# Patient Record
Sex: FEMALE | Race: BLACK OR AFRICAN AMERICAN | Employment: FULL TIME | URBAN - METROPOLITAN AREA
[De-identification: names, ages, dates, MRNs, and addresses within clinical notes are randomized per-mention and may not be internally consistent; named-entity substitution may affect disease eponyms.]

---

## 2018-09-24 ENCOUNTER — OFFICE VISIT (OUTPATIENT)
Dept: SURGERY | Age: 48
End: 2018-09-24

## 2018-09-24 VITALS
HEIGHT: 63 IN | HEART RATE: 78 BPM | BODY MASS INDEX: 19.84 KG/M2 | WEIGHT: 112 LBS | SYSTOLIC BLOOD PRESSURE: 132 MMHG | DIASTOLIC BLOOD PRESSURE: 71 MMHG

## 2018-09-24 DIAGNOSIS — N64.4 MASTODYNIA OF LEFT BREAST: Primary | ICD-10-CM

## 2018-09-24 DIAGNOSIS — N60.12 FIBROCYSTIC BREAST CHANGES, LEFT: ICD-10-CM

## 2018-09-24 RX ORDER — IBUPROFEN 200 MG
TABLET ORAL
COMMUNITY

## 2018-09-24 RX ORDER — FLUOXETINE 10 MG/1
CAPSULE ORAL
Refills: 1 | COMMUNITY
Start: 2018-09-04

## 2018-09-24 NOTE — PROGRESS NOTES
HISTORY OF PRESENT ILLNESS  Chris Brizuela is a 56 y.o. female. HPI ESTABLISHED patient here for recurrent LEFT breast painful lump. It has been particularly bothersome since she had her mammogram in March. Takes Advil for pain. LEFT breast cyst aspirated 2015 at LEFT breast 1-2 OC, 5 cc cyst.   Lifetime risk 28%     Family history-  Sister diagnosed with breast cancer at age 52, survivor- no genetic testing. Paternal 1st cousin diagnosed with breast cancer at age 39, . Patient is genetic test negative [facility]).     Breast imaging-  Recent mammogram and US done 2018 at [facility]. Report in CC. Review of Systems   All other systems reviewed and are negative. Physical Exam   Pulmonary/Chest: Right breast exhibits no inverted nipple, no mass, no nipple discharge, no skin change and no tenderness. Left breast exhibits no inverted nipple, no mass, no nipple discharge, no skin change and no tenderness. Breasts are symmetrical.   No enlarged cyst today on us, just multiple small cysts on left breast.   Lymphadenopathy:     She has no cervical adenopathy. She has no axillary adenopathy. Nursing note and vitals reviewed. ASSESSMENT and PLAN    ICD-10-CM ICD-9-CM    1. Mastodynia of left breast N64.4 611.71    2. Fibrocystic breast changes, left N60.12 610.1      Discussed breast pain which is not typically a sign of breast cancer. I have given the patient a breast pain sheet. She may try vitamin E, Evening Primrose Oil, or decreased caffeine intake if breast pain persists.

## 2018-09-24 NOTE — MR AVS SNAPSHOT
[address] [facility] G1 [address] [place] [address] 
[phone] Patient: Peggy Muniz MRN: [id number] :1970 Visit Information Date & Time Provider Department Dept. Phone Encounter #  
 2018  2:40  Ede Mahmood MD [address] at [place] [address] [phone] Upcoming Health Maintenance Date Due DTaP/Tdap/Td series (1 - Tdap) 1992 PAP AKA CERVICAL CYTOLOGY 2/3/2018 Influenza Age 5 to Adult 2018 COLONOSCOPY 3/15/2020 Allergies as of 2018  Review Complete On: 2018 By: Kay Wisdom RN No Known Allergies Current Immunizations  Never Reviewed No immunizations on file. Not reviewed this visit Vitals BP Pulse Height(growth percentile) Weight(growth percentile) LMP BMI  
 132/71 78 5' 3\" (1.6 m) 112 lb (50.8 kg) 2018 19.84 kg/m2 OB Status Smoking Status Having regular periods Never Smoker Vitals History BMI and BSA Data Body Mass Index Body Surface Area  
 19.84 kg/m 2 1.5 m 2 Your Updated Medication List  
  
   
This list is accurate as of 18  3:10 PM.  Always use your most recent med list. ADVIL 200 mg tablet Generic drug:  ibuprofen Take  by mouth. FLUoxetine 10 mg capsule Commonly known as:  PROzac VALTREX 500 mg tablet Generic drug:  valACYclovir Take  by mouth as needed. Patient Instructions Breast Lumps: Care Instructions Your Care Instructions Breast lumps are common, especially in women between ages 27 and 48. Many women's breasts feel lumpy and tender before their menstrual period. Women also may have lumps when they are breastfeeding. Breast lumps may go away after menopause. All new breast lumps in women after menopause should be checked by a doctor. Although lumps may be normal for you, it is important to have your doctor check any lump or thickness that is not like the rest of your breast to make sure it is not cancer. A lump may be larger, harder, or different from the rest of your breast tissue. Follow-up care is a key part of your treatment and safety. Be sure to make and go to all appointments, and call your doctor if you are having problems. It's also a good idea to know your test results and keep a list of the medicines you take. How can you care for yourself at home? · Make an appointment to have a mammogram and other follow-up visits as recommended by your doctor. When should you call for help? Watch closely for changes in your health, and be sure to contact your doctor if: 
  · You do not get better as expected.  
  · Your breast has changed.  
  · You have pain in your breast.  
  · You have a discharge from your nipple.  
  · A breast lump changes or does not go away. Where can you learn more? Go to http://sophia-alex.info/. Enter N156 in the search box to learn more about \"Breast Lumps: Care Instructions. \" Current as of: October 6, 2017 Content Version: 11.7 © 9874-0378 BookNow, Incorporated. Care instructions adapted under license by [employer] (which disclaims liability or warranty for this information). If you have questions about a medical condition or this instruction, always ask your healthcare professional. [place] [address] any warranty or liability for your use of this information. Introducing [facility] & HEALTH SERVICES! [facility] introduces [employer] patient portal. Now you can access parts of your medical record, email your doctor's office, and request medication refills online. 1. In your internet browser, go to [email or web address] 2. Click on the First Time User? Click Here link in the Sign In box. You will see the New Member Sign Up page. 3. Enter your VenueSpot Access Code exactly as it appears below. You will not need to use this code after youve completed the sign-up process. If you do not sign up before the expiration date, you must request a new code. · VenueSpot Access Code: [email or web address] Expires: 12/23/2018  2:45 PM 
 
4. Enter the last four digits of your Social Security Number (xxxx) and Date of Birth (mm/dd/yyyy) as indicated and click Submit. You will be taken to the next sign-up page. 5. Create a Mosaict ID. This will be your VenueSpot login ID and cannot be changed, so think of one that is secure and easy to remember. 6. Create a VenueSpot password. You can change your password at any time. 7. Enter your Password Reset Question and Answer. This can be used at a later time if you forget your password. 8. Enter your e-mail address. You will receive e-mail notification when new information is available in [address]. 9. Click Sign Up. You can now view and download portions of your medical record. 10. Click the Download Summary menu link to download a portable copy of your medical information. If you have questions, please visit the Frequently Asked Questions section of the VenueSpot website. Remember, VenueSpot is NOT to be used for urgent needs. For medical emergencies, dial 911. Now available from your iPhone and Android! Please provide this summary of care documentation to your next provider. Your primary care clinician is listed as Tessa Tena. If you have any questions after today's visit, please call [phone].

## 2018-09-24 NOTE — PATIENT INSTRUCTIONS
Breast Lumps: Care Instructions  Your Care Instructions  Breast lumps are common, especially in women between ages 27 and 48. Many women's breasts feel lumpy and tender before their menstrual period. Women also may have lumps when they are breastfeeding. Breast lumps may go away after menopause. All new breast lumps in women after menopause should be checked by a doctor. Although lumps may be normal for you, it is important to have your doctor check any lump or thickness that is not like the rest of your breast to make sure it is not cancer. A lump may be larger, harder, or different from the rest of your breast tissue. Follow-up care is a key part of your treatment and safety. Be sure to make and go to all appointments, and call your doctor if you are having problems. It's also a good idea to know your test results and keep a list of the medicines you take. How can you care for yourself at home? · Make an appointment to have a mammogram and other follow-up visits as recommended by your doctor. When should you call for help? Watch closely for changes in your health, and be sure to contact your doctor if:    · You do not get better as expected.     · Your breast has changed.     · You have pain in your breast.     · You have a discharge from your nipple.     · A breast lump changes or does not go away. Where can you learn more? Go to http://sophia-alex.info/. Enter X220 in the search box to learn more about \"Breast Lumps: Care Instructions. \"  Current as of: October 6, 2017  Content Version: 11.7  © 4019-9762 Healthwise, Incorporated. Care instructions adapted under license by [employer] (which disclaims liability or warranty for this information). If you have questions about a medical condition or this instruction, always ask your healthcare professional. [address] any warranty or liability for your use of this information.

## 2018-09-25 PROBLEM — N64.4 MASTODYNIA OF LEFT BREAST: Status: ACTIVE | Noted: 2018-09-25

## 2018-09-25 PROBLEM — N60.12 FIBROCYSTIC BREAST CHANGES, LEFT: Status: ACTIVE | Noted: 2018-09-25

## 2022-03-19 PROBLEM — N64.4 MASTODYNIA OF LEFT BREAST: Status: ACTIVE | Noted: 2018-09-25

## 2022-03-20 PROBLEM — N60.12 FIBROCYSTIC BREAST CHANGES, LEFT: Status: ACTIVE | Noted: 2018-09-25

## 2022-10-11 ENCOUNTER — TELEPHONE (OUTPATIENT)
Dept: FAMILY MEDICINE CLINIC | Age: 52
End: 2022-10-11

## 2022-10-11 NOTE — TELEPHONE ENCOUNTER
Patient call state she is having neck pain and possible spider bites. Suggest patient to go to  and be triage ASAP and follow up with her NEW PCP. Patient state that appt is in November and ask can  follow up with current symptoms. Last office visit 2015.

## 2022-12-27 ENCOUNTER — VIRTUAL VISIT (OUTPATIENT)
Dept: INTERNAL MEDICINE CLINIC | Age: 52
End: 2022-12-27
Payer: COMMERCIAL

## 2022-12-27 DIAGNOSIS — Z11.3 ROUTINE SCREENING FOR STI (SEXUALLY TRANSMITTED INFECTION): ICD-10-CM

## 2022-12-27 DIAGNOSIS — E55.9 VITAMIN D DEFICIENCY: ICD-10-CM

## 2022-12-27 DIAGNOSIS — Z00.00 WELL WOMAN EXAM (NO GYNECOLOGICAL EXAM): Primary | ICD-10-CM

## 2022-12-27 DIAGNOSIS — R23.2 HOT FLASHES: ICD-10-CM

## 2022-12-27 PROCEDURE — 99386 PREV VISIT NEW AGE 40-64: CPT | Performed by: NURSE PRACTITIONER

## 2022-12-27 RX ORDER — PROGESTERONE 200 MG/1
CAPSULE ORAL
COMMUNITY
Start: 2022-12-05

## 2022-12-27 RX ORDER — ESTRADIOL 0.05 MG/D
PATCH TRANSDERMAL
COMMUNITY
Start: 2022-12-05

## 2022-12-27 RX ORDER — ZOLPIDEM TARTRATE 5 MG/1
TABLET ORAL
COMMUNITY

## 2022-12-27 NOTE — PROGRESS NOTES
Chief Complaint   Patient presents with    New Patient   Send link to [phone]  No concerns today. 1. Have you been to the ER, urgent care clinic since your last visit? Hospitalized since your last visit? No    2. Have you seen or consulted any other health care providers outside of the [address] [place] [address] since your last visit? Include any pap smears or colon screening.  No

## 2022-12-27 NOTE — PROGRESS NOTES
Subjective:   47 y.o. female for Well Woman Check. Her gyne and breast care is done elsewhere by her Ob-Gyne physician. Patient Active Problem List   Diagnosis Code    Brief compensatory mood swings R45.86    UTI (lower urinary tract infection) N39.0    Hypovitaminosis D E55.9    Mastodynia of left breast N64.4    Fibrocystic breast changes, left N60.12            ROS: Feeling generally well. No TIA's or unusual headaches, no dysphagia. No prolonged cough. No dyspnea or chest pain on exertion. No abdominal pain, change in bowel habits, black or bloody stools. No urinary tract symptoms. No new or unusual musculoskeletal symptoms. Specific concerns today:     Est care  [address] w/ OBGYN  Mother is a pt here  Mammo: scheduled for Feb  Colonoscopy: utd- father passed of colon ca in his 62s    Hot flashes: gudelia-menopausal, on HRT and SSRI    Overall feels well    Utd on vaccines  . Objective: The patient appears well, alert, oriented x 3, in no distress. There were no vitals taken for this visit. ENT normal.  Neck supple. No adenopathy or thyromegaly. GAURAV. Lungs are clear, good air entry, no wheezes, rhonchi or rales. S1 and S2 normal, no murmurs, regular rate and rhythm. Abdomen soft without tenderness, guarding, mass or organomegaly. Extremities show no edema, normal peripheral pulses. Neurological is normal, no focal findings. Breast and Pelvic exams are deferred. Assessment/Plan:   Well Woman    1. Well woman exam (no gynecological exam)    - METABOLIC PANEL, COMPREHENSIVE  - CBC W/O DIFF  - LIPID PANEL  - VITAMIN D, 25 HYDROXY; Future  - VITAMIN D, 25 HYDROXY    2. Routine screening for STI (sexually transmitted infection)    - HEPATITIS C QT BY PCR WITH REFLEX GENOTYPE; Future  - T PALLIDUM SCREEN W/REFLEX  - HIV 1/2 AG/AB, 4TH GENERATION,W RFLX CONFIRM  - HEPATITIS C QT BY PCR WITH REFLEX GENOTYPE    3. Hot flashes    - THYROID CASCADE PROFILE; Future      4.  Vitamin D deficiency    - VITAMIN D, 25 HYDROXY; Future

## 2023-02-09 RX ORDER — ERGOCALCIFEROL 1.25 MG/1
50000 CAPSULE ORAL
Qty: 8 CAPSULE | Refills: 0 | Status: SHIPPED | OUTPATIENT
Start: 2023-02-09 | End: 2023-03-31

## 2023-02-20 LAB — MAMMOGRAPHY, EXTERNAL: NORMAL

## 2023-04-05 ENCOUNTER — OFFICE VISIT (OUTPATIENT)
Dept: INTERNAL MEDICINE CLINIC | Age: 53
End: 2023-04-05
Payer: COMMERCIAL

## 2023-04-05 PROCEDURE — 99213 OFFICE O/P EST LOW 20 MIN: CPT | Performed by: NURSE PRACTITIONER

## 2023-04-05 RX ORDER — SERTRALINE HYDROCHLORIDE 50 MG/1
50 TABLET, FILM COATED ORAL DAILY
Qty: 30 TABLET | Refills: 1 | Status: SHIPPED
Start: 2023-04-05

## 2023-04-05 NOTE — PROGRESS NOTES
Id  HIPAA verified by two patient identifiers. Health Maintenance Due   Topic    Depression Screen     COVID-19 Vaccine (1)    DTaP/Tdap/Td series (1 - Tdap)    Cervical cancer screen     Breast Cancer Screen Mammogram     Colorectal Cancer Screening Combo     Shingles Vaccine (1 of 2)     Chief Complaint   Patient presents with    Hypertension     Follow up       Visit Vitals  /65   Pulse 79   Temp 98.6 °F (37 °C) (Temporal)   Resp 18   Ht 5' 3\" (1.6 m)   Wt 105 lb (47.6 kg)   SpO2 100%   BMI 18.60 kg/m²       Pain Scale: 0 - No pain/10  Pain Location:   1. Have you been to the ER, urgent care clinic since your last visit? Hospitalized since your last visit? No    2. Have you seen or consulted any other health care providers outside of the [address] [place] [address] since your last visit? Include any pap smears or colon screening.  No

## 2023-04-05 NOTE — PROGRESS NOTES
Subjective: (As above and below)     Chief Complaint   Patient presents with    Hypertension     Follow up       Esteban Merlin. Priyanka Thompson is a 47y.o. year old female who presents for     Depression and anxiety: she has been feeling overwhelmed, stressed, anxious, down  Variable sleep patterns  She has been on fluoxetine for several years, initially from OBGYN for PMDD  She is on estradiol now which helps w a lot of s/s but may be contributing some to anxiety  She is doing well at work, she is working on work/life balance  She is interested in alternate medication  No manic tendencies    BP Readings from Last 3 Encounters:   04/05/23 134/65   09/24/18 132/71   09/16/15 130/83        Reviewed PmHx, RxHx, FmHx, SocHx, AllgHx and updated in chart. Family History   Problem Relation Age of Onset    Hypertension Mother     COPD Mother     Diabetes Father     Hypertension Father     Cancer Father     Colon Cancer Father     Breast Cancer Sister        Past Medical History:   Diagnosis Date    Tendonitis of wrist, right       Social History     Socioeconomic History    Marital status:    Tobacco Use    Smoking status: Never    Smokeless tobacco: Never   Substance and Sexual Activity    Alcohol use: Yes     Alcohol/week: 3.0 standard drinks     Types: 3 Standard drinks or equivalent per week     Comment: Occ    Drug use: No    Sexual activity: Yes     Partners: Male     Birth control/protection: None     Social Determinants of Health     Financial Resource Strain: Low Risk     Difficulty of Paying Living Expenses: Not hard at all   Food Insecurity: No Food Insecurity    Worried About [address] in the Last Year: Never true    Ran Out of Food in the Last Year: Never true          Current Outpatient Medications   Medication Sig    sertraline (ZOLOFT) 50 mg tablet Take 1 Tablet by mouth daily.  Take 1/2 tab daily x 1 week then increase to 1 tab daily    estradioL (CLIMARA) 0.05 mg/24 hr     progesterone (PROMETRIUM) 200 mg capsule     valACYclovir (VALTREX) 500 mg tablet Take  by mouth as needed. zolpidem (AMBIEN) 5 mg tablet Ambien 5 mg tablet   1 p.o. @ bedtime p.r.n. sleep (Patient not taking: Reported on 4/5/2023)     No current facility-administered medications for this visit. Review of Systems:   Constitutional:    Negative for fever and chills, negative diaphoresis. HEENT:              Negative for neck pain and stiffness. Eyes:                  Negative for visual disturbance, itching, redness or discharge. Respiratory:        Negative for cough and shortness of breath. Cardiovascular:  Negative for chest pain and palpitations. Gastrointestinal: Negative for nausea, vomiting, abdominal pain, diarrhea or constipation. Genitourinary:     Negative for dysuria and frequency. Musculoskeletal: Negative for falls, tenderness and swelling. Skin:                    Negative for rash, masses or lesions. Neurological:       Negative for dizzyness, seizure, loss of consciousness, weakness and numbness. Objective:     Vitals:    04/05/23 1121   BP: 134/65   Pulse: 79   Resp: 18   Temp: 98.6 °F (37 °C)   TempSrc: Temporal   SpO2: 100%   Weight: 105 lb (47.6 kg)   Height: 5' 3\" (1.6 m)       Results for orders placed or performed in visit on 44/14/55   METABOLIC PANEL, COMPREHENSIVE   Result Value Ref Range    Glucose 85 70 - 99 mg/dL    BUN 8 6 - 24 mg/dL    Creatinine 0.68 0.57 - 1.00 mg/dL    eGFR 105 >59 mL/min/1.73    BUN/Creatinine ratio 12 9 - 23    Sodium 138 134 - 144 mmol/L    Potassium 4.0 3.5 - 5.2 mmol/L    Chloride 101 96 - 106 mmol/L    CO2 21 20 - 29 mmol/L    Calcium 9.4 8.7 - 10.2 mg/dL    Protein, total 7.0 6.0 - 8.5 g/dL    Albumin 4.8 3.8 - 4.9 g/dL    GLOBULIN, TOTAL 2.2 1.5 - 4.5 g/dL    A-G Ratio 2.2 1.2 - 2.2    Bilirubin, total 0.5 0.0 - 1.2 mg/dL    Alk.  phosphatase 83 44 - 121 IU/L    AST (SGOT) 13 0 - 40 IU/L    ALT (SGPT) 7 0 - 32 IU/L   CBC W/O DIFF   Result Value Ref Range    WBC 3.7 3.4 - 10.8 x10E3/uL    RBC 4.20 3.77 - 5.28 x10E6/uL    HGB 12.5 11.1 - 15.9 g/dL    HCT 37.5 34.0 - 46.6 %    MCV 89 79 - 97 fL    MCH 29.8 26.6 - 33.0 pg    MCHC 33.3 31.5 - 35.7 g/dL    RDW 13.9 11.7 - 15.4 %    PLATELET 589 996 - 663 x10E3/uL   LIPID PANEL   Result Value Ref Range    Cholesterol, total 181 100 - 199 mg/dL    Triglyceride 57 0 - 149 mg/dL    HDL Cholesterol 74 >39 mg/dL    VLDL, calculated 11 5 - 40 mg/dL    LDL, calculated 96 0 - 99 mg/dL   T PALLIDUM SCREEN W/REFLEX   Result Value Ref Range    T PALLIDUM AB Non Reactive Non Reactive   HIV 1/2 AG/AB, 4TH GENERATION,W RFLX CONFIRM   Result Value Ref Range    HIV SCREEN 4TH GENERATION WRFX Non Reactive Non Reactive   VITAMIN D, 25 HYDROXY   Result Value Ref Range    VITAMIN D, 25-HYDROXY 10.1 (L) 30.0 - 100.0 ng/mL   THYROID CASCADE PROFILE   Result Value Ref Range    TSH 1.900 0.450 - 4.500 uIU/mL   HEPATITIS C QT BY PCR WITH REFLEX GENOTYPE   Result Value Ref Range    Hepatitis C Quantitation HCV Not Detected IU/mL    HCV log10 CANCELED log10 IU/mL    Test information Comment     HCV Genotype CANCELED    CVD REPORT   Result Value Ref Range    INTERPRETATION Note          Physical Examination: General appearance - alert, well appearing, and in no distress and tearful  Mental status - alert, oriented to person, place, and time  Chest - clear to auscultation, no wheezes, rales or rhonchi, symmetric air entry  Heart - normal rate, regular rhythm, normal S1, S2, no murmurs, rubs, clicks or gallops      Assessment/ Plan:     1. Anxiety and depression    - sertraline (ZOLOFT) 50 mg tablet; Take 1 Tablet by mouth daily. Take 1/2 tab daily x 1 week then increase to 1 tab daily  Dispense: 30 Tablet; Refill: 1      I have discussed the diagnosis with the patient and the intended plan as seen in the above orders. The patient has received an after-visit summary and questions were answered concerning future plans.   Pt conveyed understanding of plan. Medication Side Effects and Warnings were discussed with patient: yes  Patient Labs were reviewed: yes  Patient Past Records were reviewed:  yes    Radha Jensen.  Minor Avendano NP

## 2023-05-05 ENCOUNTER — VIRTUAL VISIT (OUTPATIENT)
Dept: INTERNAL MEDICINE CLINIC | Age: 53
End: 2023-05-05
Payer: COMMERCIAL

## 2023-05-05 DIAGNOSIS — F41.9 ANXIETY AND DEPRESSION: ICD-10-CM

## 2023-05-05 DIAGNOSIS — R63.4 WEIGHT LOSS: Primary | ICD-10-CM

## 2023-05-05 DIAGNOSIS — F32.A ANXIETY AND DEPRESSION: ICD-10-CM

## 2023-05-05 PROCEDURE — 99213 OFFICE O/P EST LOW 20 MIN: CPT | Performed by: NURSE PRACTITIONER

## 2023-06-13 NOTE — TELEPHONE ENCOUNTER
Pt left a voice message requesting a refill. [phone]    Last appointment: 05/05/2023 Virtual visit NP Kasey Conway   Next appointment: Nothing scheduled   Previous refill encounter(s):   04/05/2023 Zoloft #30 with 1 refill.      For Pharmacy Admin Tracking Only    Program: Medication Refill  Intervention Detail: New Rx: 1, reason: Patient Preference  Time Spent (min): 5      Requested Prescriptions     Pending Prescriptions Disp Refills    sertraline (ZOLOFT) 50 MG tablet [Pharmacy Med Name: SERTRALINE 50MG TABLETS] 90 tablet 0     Sig: Take 1 tablet by mouth daily

## 2024-05-31 ENCOUNTER — TELEPHONE (OUTPATIENT)
Facility: CLINIC | Age: 54
End: 2024-05-31

## 2024-05-31 NOTE — TELEPHONE ENCOUNTER
----- Message from Donte Pedraza sent at 5/29/2024  2:22 PM EDT -----  Subject: Referral Request    Reason for referral request? patent scheduled for 7/11/24 due to   unexplained wt lose. would like to complete comprehensive labs prior to   this appt   Provider patient wants to be referred to(if known):     Provider Phone Number(if known):    Additional Information for Provider? also would like to be seen sooner if   possible   ---------------------------------------------------------------------------  --------------  CALL BACK INFO    [phone]; OK to leave message on voicemail  ---------------------------------------------------------------------------  --------------

## 2024-07-18 ENCOUNTER — OFFICE VISIT (OUTPATIENT)
Facility: CLINIC | Age: 54
End: 2024-07-18
Payer: COMMERCIAL

## 2024-07-18 VITALS
BODY MASS INDEX: 18.23 KG/M2 | HEART RATE: 60 BPM | RESPIRATION RATE: 18 BRPM | TEMPERATURE: 96.8 F | OXYGEN SATURATION: 100 % | SYSTOLIC BLOOD PRESSURE: 130 MMHG | DIASTOLIC BLOOD PRESSURE: 50 MMHG | WEIGHT: 102.9 LBS

## 2024-07-18 DIAGNOSIS — E55.9 VITAMIN D DEFICIENCY: ICD-10-CM

## 2024-07-18 DIAGNOSIS — Z00.00 WELL WOMAN EXAM (NO GYNECOLOGICAL EXAM): Primary | ICD-10-CM

## 2024-07-18 DIAGNOSIS — R63.4 WEIGHT LOSS: ICD-10-CM

## 2024-07-18 DIAGNOSIS — F32.89 OTHER DEPRESSION: ICD-10-CM

## 2024-07-18 DIAGNOSIS — Z00.00 WELL WOMAN EXAM (NO GYNECOLOGICAL EXAM): ICD-10-CM

## 2024-07-18 PROCEDURE — 99213 OFFICE O/P EST LOW 20 MIN: CPT | Performed by: NURSE PRACTITIONER

## 2024-07-18 PROCEDURE — 99396 PREV VISIT EST AGE 40-64: CPT | Performed by: NURSE PRACTITIONER

## 2024-07-18 RX ORDER — CLOTRIMAZOLE AND BETAMETHASONE DIPROPIONATE 10; .64 MG/G; MG/G
CREAM TOPICAL 2 TIMES DAILY
COMMUNITY
Start: 2024-04-23 | End: 2024-08-21

## 2024-07-18 RX ORDER — TRAZODONE HYDROCHLORIDE 100 MG/1
100 TABLET ORAL NIGHTLY
COMMUNITY

## 2024-07-18 RX ORDER — ESTRADIOL 0.05 MG/D
PATCH TRANSDERMAL
COMMUNITY

## 2024-07-18 RX ORDER — BUPROPION HYDROCHLORIDE 150 MG/1
150 TABLET ORAL EVERY MORNING
Qty: 30 TABLET | Refills: 3 | Status: SHIPPED | OUTPATIENT
Start: 2024-07-18

## 2024-07-18 SDOH — ECONOMIC STABILITY: INCOME INSECURITY: HOW HARD IS IT FOR YOU TO PAY FOR THE VERY BASICS LIKE FOOD, HOUSING, MEDICAL CARE, AND HEATING?: NOT HARD AT ALL

## 2024-07-18 SDOH — ECONOMIC STABILITY: FOOD INSECURITY: WITHIN THE PAST 12 MONTHS, THE FOOD YOU BOUGHT JUST DIDN'T LAST AND YOU DIDN'T HAVE MONEY TO GET MORE.: NEVER TRUE

## 2024-07-18 SDOH — ECONOMIC STABILITY: HOUSING INSECURITY
IN THE LAST 12 MONTHS, WAS THERE A TIME WHEN YOU DID NOT HAVE A STEADY PLACE TO SLEEP OR SLEPT IN A SHELTER (INCLUDING NOW)?: NO

## 2024-07-18 SDOH — ECONOMIC STABILITY: FOOD INSECURITY: WITHIN THE PAST 12 MONTHS, YOU WORRIED THAT YOUR FOOD WOULD RUN OUT BEFORE YOU GOT MONEY TO BUY MORE.: NEVER TRUE

## 2024-07-18 ASSESSMENT — PATIENT HEALTH QUESTIONNAIRE - PHQ9
4. FEELING TIRED OR HAVING LITTLE ENERGY: NOT AT ALL
SUM OF ALL RESPONSES TO PHQ QUESTIONS 1-9: 4
9. THOUGHTS THAT YOU WOULD BE BETTER OFF DEAD, OR OF HURTING YOURSELF: NOT AT ALL
2. FEELING DOWN, DEPRESSED OR HOPELESS: NOT AT ALL
1. LITTLE INTEREST OR PLEASURE IN DOING THINGS: NOT AT ALL
SUM OF ALL RESPONSES TO PHQ QUESTIONS 1-9: 4
5. POOR APPETITE OR OVEREATING: MORE THAN HALF THE DAYS
SUM OF ALL RESPONSES TO PHQ QUESTIONS 1-9: 4
8. MOVING OR SPEAKING SO SLOWLY THAT OTHER PEOPLE COULD HAVE NOTICED. OR THE OPPOSITE, BEING SO FIGETY OR RESTLESS THAT YOU HAVE BEEN MOVING AROUND A LOT MORE THAN USUAL: NOT AT ALL
10. IF YOU CHECKED OFF ANY PROBLEMS, HOW DIFFICULT HAVE THESE PROBLEMS MADE IT FOR YOU TO DO YOUR WORK, TAKE CARE OF THINGS AT HOME, OR GET ALONG WITH OTHER PEOPLE: NOT DIFFICULT AT ALL
SUM OF ALL RESPONSES TO PHQ QUESTIONS 1-9: 4
6. FEELING BAD ABOUT YOURSELF - OR THAT YOU ARE A FAILURE OR HAVE LET YOURSELF OR YOUR FAMILY DOWN: NOT AT ALL
SUM OF ALL RESPONSES TO PHQ9 QUESTIONS 1 & 2: 0
3. TROUBLE FALLING OR STAYING ASLEEP: NOT AT ALL
7. TROUBLE CONCENTRATING ON THINGS, SUCH AS READING THE NEWSPAPER OR WATCHING TELEVISION: MORE THAN HALF THE DAYS

## 2024-07-18 NOTE — PROGRESS NOTES
\"Have you been to the ER, urgent care clinic since your last visit?  Hospitalized since your last visit?\"    NO    “Have you seen or consulted any other health care providers outside of [facility] since your last visit?”    NO     “Have you had a pap smear?”    NO    No cervical cancer screening on file         “Have you had a colorectal cancer screening such as a colonoscopy/FIT/Cologuard?    NO    Date of last Colonoscopy: 3/15/2010  No cologuard on file  No FIT/FOBT on file   No flexible sigmoidoscopy on file       Click Here for Release of Records Request    Chief Complaint   Patient presents with    Follow-up     BP (!) 142/76 (Site: Right Upper Arm, Position: Sitting, Cuff Size: Child)   Pulse 66   Temp 96.8 °F (36 °C) (Oral)   Resp 18   Wt 46.7 kg (102 lb 14.4 oz)   SpO2 100%   BMI 18.23 kg/m²

## 2024-07-18 NOTE — PROGRESS NOTES
Subjective: (As above and below)     Chief Complaint   Patient presents with    Follow-up     Grace Madden is a 53 y.o. year old female who presents for     Depression she self tapered sertraline bc she did not like the way it made her feel but can't express exactly how  She continues to have depression  Appetite decreased, reflected in weight loss (no pain/problems when eating, just no appetite)  Sleep is poor, started on trazodone from OBGYN at 50mg, helps a bit but still waking a lot  Denies SH/SI  Trouble w marriage  Struggles w motivation  No s/s of dixon/hypomania    BP Readings from Last 3 Encounters:   07/18/24 (!) 130/50   04/05/23 134/65       Wt Readings from Last 3 Encounters:   07/18/24 46.7 kg (102 lb 14.4 oz)   04/05/23 47.6 kg (105 lb)     Colon ca screening; utd    Otherwise feels well physically      Reviewed PmHx, RxHx, FmHx, SocHx, AllgHx and updated in chart.  Family History   Problem Relation Age of Onset    Cancer Father     Hypertension Father     Diabetes Father     COPD Mother     Hypertension Mother     Colon Cancer Father     Breast Cancer Sister        Past Medical History:   Diagnosis Date    Tendonitis of wrist, right       Social History     Socioeconomic History    Marital status:      Spouse name: None    Number of children: None    Years of education: None    Highest education level: None   Tobacco Use    Smoking status: Never    Smokeless tobacco: Never   Substance and Sexual Activity    Alcohol use: Yes     Alcohol/week: 3.0 standard drinks of alcohol    Drug use: No     Social Determinants of Health     Financial Resource Strain: Low Risk  (7/18/2024)    Overall Financial Resource Strain (CARDIA)     Difficulty of Paying Living Expenses: Not hard at all   Food Insecurity: No Food Insecurity (7/18/2024)    Hunger Vital Sign     Worried About Running Out of Food in the Last Year: Never true     Ran Out of Food in the Last Year: Never true   Transportation

## 2024-07-20 LAB — TSH SERPL DL<=0.05 MIU/L-ACNC: 1.2 UIU/ML (ref 0.45–4.5)

## 2024-07-23 LAB
25(OH)D3 SERPL-MCNC: 19.2 NG/ML (ref 30–100)
ALBUMIN SERPL-MCNC: 4.5 G/DL (ref 3.5–5)
ALBUMIN/GLOB SERPL: 1.5 (ref 1.1–2.2)
ALP SERPL-CCNC: 75 U/L (ref 45–117)
ALT SERPL-CCNC: 23 U/L (ref 12–78)
ANION GAP SERPL CALC-SCNC: 6 MMOL/L (ref 5–15)
AST SERPL-CCNC: 9 U/L (ref 15–37)
BILIRUB SERPL-MCNC: 0.4 MG/DL (ref 0.2–1)
BUN SERPL-MCNC: 13 MG/DL (ref 6–20)
BUN/CREAT SERPL: 19 (ref 12–20)
CALCIUM SERPL-MCNC: 9.4 MG/DL (ref 8.5–10.1)
CHLORIDE SERPL-SCNC: 107 MMOL/L (ref 97–108)
CHOLEST SERPL-MCNC: 175 MG/DL
CO2 SERPL-SCNC: 26 MMOL/L (ref 21–32)
CREAT SERPL-MCNC: 0.7 MG/DL (ref 0.55–1.02)
ERYTHROCYTE [DISTWIDTH] IN BLOOD BY AUTOMATED COUNT: 14.1 % (ref 11.5–14.5)
GLOBULIN SER CALC-MCNC: 3.1 G/DL (ref 2–4)
GLUCOSE SERPL-MCNC: 80 MG/DL (ref 65–100)
HCT VFR BLD AUTO: 38.8 % (ref 35–47)
HDLC SERPL-MCNC: 80 MG/DL
HDLC SERPL: 2.2 (ref 0–5)
HGB BLD-MCNC: 13 G/DL (ref 11.5–16)
LDLC SERPL CALC-MCNC: 84.2 MG/DL (ref 0–100)
MCH RBC QN AUTO: 30.2 PG (ref 26–34)
MCHC RBC AUTO-ENTMCNC: 33.5 G/DL (ref 30–36.5)
MCV RBC AUTO: 90.2 FL (ref 80–99)
NRBC # BLD: 0 K/UL (ref 0–0.01)
NRBC BLD-RTO: 0 PER 100 WBC
PLATELET # BLD AUTO: 165 K/UL (ref 150–400)
PMV BLD AUTO: 12 FL (ref 8.9–12.9)
POTASSIUM SERPL-SCNC: 4.6 MMOL/L (ref 3.5–5.1)
PROT SERPL-MCNC: 7.6 G/DL (ref 6.4–8.2)
RBC # BLD AUTO: 4.3 M/UL (ref 3.8–5.2)
SODIUM SERPL-SCNC: 139 MMOL/L (ref 136–145)
TRIGL SERPL-MCNC: 54 MG/DL
VLDLC SERPL CALC-MCNC: 10.8 MG/DL
WBC # BLD AUTO: 4 K/UL (ref 3.6–11)

## 2024-07-23 RX ORDER — ERGOCALCIFEROL 1.25 MG/1
50000 CAPSULE ORAL WEEKLY
Qty: 10 CAPSULE | Refills: 0 | Status: SHIPPED | OUTPATIENT
Start: 2024-07-23 | End: 2024-09-25

## 2024-08-06 ENCOUNTER — TELEMEDICINE (OUTPATIENT)
Facility: CLINIC | Age: 54
End: 2024-08-06
Payer: COMMERCIAL

## 2024-08-06 DIAGNOSIS — F41.9 ANXIETY AND DEPRESSION: Primary | ICD-10-CM

## 2024-08-06 DIAGNOSIS — F32.A ANXIETY AND DEPRESSION: Primary | ICD-10-CM

## 2024-08-06 DIAGNOSIS — D22.9 CHANGE IN MOLE: ICD-10-CM

## 2024-08-06 PROBLEM — N64.4 MASTODYNIA OF LEFT BREAST: Status: RESOLVED | Noted: 2018-09-25 | Resolved: 2024-08-06

## 2024-08-06 PROCEDURE — 99213 OFFICE O/P EST LOW 20 MIN: CPT | Performed by: NURSE PRACTITIONER

## 2024-08-06 NOTE — PROGRESS NOTES
Grace Madden is a 53 y.o. female  Chief Complaint   Patient presents with    Follow-up     Pt states Wt concerns     \"Have you been to the ER, urgent care clinic since your last visit?  Hospitalized since your last visit?\"    NO    “Have you seen or consulted any other health care providers outside of [facility] since your last visit?”    NO     “Have you had a pap smear?”    NO    No cervical cancer screening on file   Click Here for Release of Records Request

## 2024-08-06 NOTE — PROGRESS NOTES
Grace Madden, was evaluated through a synchronous (real-time) audio-video encounter. The patient (or guardian if applicable) is aware that this is a billable service, which includes applicable co-pays. This Virtual Visit was conducted with patient's (and/or legal guardian's) consent. Patient identification was verified, and a caregiver was present when appropriate.   The patient was located at Home: [id number] [place] [address] [facility] [place] [address]  Provider was located at Home (Appt Dept State): [place]  Confirm you are appropriately licensed, registered, or certified to deliver care in the state where the patient is located as indicated above. If you are not or unsure, please re-schedule the visit: Yes, I confirm.     Grace Madden (:  1970) is a Established patient, presenting virtually for evaluation of the following:    Assessment & Plan   Below is the assessment and plan developed based on review of pertinent history, physical exam, labs, studies, and medications.    Too soon to eval wellbutrin, will fu via [employer] in a few weeks      1. Anxiety and depression  2. Change in mole  -     [facility] - Zully Yanez MD, Dermatology, [facility] ([facility])    No follow-ups on file.       Subjective   HPI  Review of Systems    Anxiety: started wellbutrin, no a/e  First few days mild headache  Has not noticed any changes w appetite or mood yet  +dry mouth  She was on vacation last week and was eating well then  Sleep is good    Mole: L side of neck that has gotten larger and itchy       Wt Readings from Last 3 Encounters:   24 46.7 kg (102 lb 14.4 oz)   23 47.6 kg (105 lb)         Objective   Patient-Reported Vitals  No data recorded     Physical Exam  [INSTRUCTIONS:  \"[x]\" Indicates a positive item  \"[]\" Indicates a negative item  -- DELETE ALL ITEMS NOT EXAMINED]    Constitutional: [x] Appears well-developed and well-nourished [x] No apparent distress      [] Abnormal -

## 2024-09-26 RX ORDER — ERGOCALCIFEROL 1.25 MG/1
CAPSULE, LIQUID FILLED ORAL
Qty: 10 CAPSULE | Refills: 0 | OUTPATIENT
Start: 2024-09-26

## 2024-09-26 NOTE — TELEPHONE ENCOUNTER
Last appointment: 08/06/2024 Virtual visit BENJAMIN Hirsch   Next appointment: Nothing scheduled   Previous refill encounter(s):   07/23/2024 Vitamin D2 #10     For Pharmacy Admin Tracking Only    Program: Medication Refill  Intervention Detail: New Rx: 1, reason: Patient Preference  Time Spent (min): 5  Requested Prescriptions     Pending Prescriptions Disp Refills    vitamin D (ERGOCALCIFEROL) 1.25 MG (33076 UT) CAPS capsule [Pharmacy Med Name: VITAMIN D2 50,000IU (ERGO) CAP RX] 10 capsule 0     Sig: TAKE 1 CAPSULE BY MOUTH 1 TIME A WEEK FOR 10 DOSES

## 2025-01-15 ENCOUNTER — TELEPHONE (OUTPATIENT)
Dept: PALLATIVE CARE | Age: 55
End: 2025-01-15

## 2025-01-16 NOTE — ADDENDUM NOTE
Addended by: HORACE RENDON on: 1/16/2025 12:39 PM     Modules accepted: Orders, Level of Service

## 2025-02-04 ENCOUNTER — TELEMEDICINE (OUTPATIENT)
Facility: CLINIC | Age: 55
End: 2025-02-04
Payer: COMMERCIAL

## 2025-02-04 DIAGNOSIS — F41.9 ANXIETY AND DEPRESSION: Primary | ICD-10-CM

## 2025-02-04 DIAGNOSIS — F51.01 PRIMARY INSOMNIA: ICD-10-CM

## 2025-02-04 DIAGNOSIS — F32.A ANXIETY AND DEPRESSION: Primary | ICD-10-CM

## 2025-02-04 PROCEDURE — 99213 OFFICE O/P EST LOW 20 MIN: CPT | Performed by: NURSE PRACTITIONER

## 2025-02-04 RX ORDER — HYDROXYZINE HYDROCHLORIDE 50 MG/1
50 TABLET, FILM COATED ORAL
Qty: 30 TABLET | Refills: 0 | Status: SHIPPED | OUTPATIENT
Start: 2025-02-04 | End: 2025-03-06

## 2025-02-04 SDOH — ECONOMIC STABILITY: INCOME INSECURITY: IN THE LAST 12 MONTHS, WAS THERE A TIME WHEN YOU WERE NOT ABLE TO PAY THE MORTGAGE OR RENT ON TIME?: NO

## 2025-02-04 SDOH — ECONOMIC STABILITY: FOOD INSECURITY: WITHIN THE PAST 12 MONTHS, YOU WORRIED THAT YOUR FOOD WOULD RUN OUT BEFORE YOU GOT MONEY TO BUY MORE.: NEVER TRUE

## 2025-02-04 SDOH — ECONOMIC STABILITY: TRANSPORTATION INSECURITY
IN THE PAST 12 MONTHS, HAS LACK OF TRANSPORTATION KEPT YOU FROM MEETINGS, WORK, OR FROM GETTING THINGS NEEDED FOR DAILY LIVING?: NO

## 2025-02-04 SDOH — ECONOMIC STABILITY: FOOD INSECURITY: WITHIN THE PAST 12 MONTHS, THE FOOD YOU BOUGHT JUST DIDN'T LAST AND YOU DIDN'T HAVE MONEY TO GET MORE.: NEVER TRUE

## 2025-02-04 SDOH — ECONOMIC STABILITY: TRANSPORTATION INSECURITY
IN THE PAST 12 MONTHS, HAS THE LACK OF TRANSPORTATION KEPT YOU FROM MEDICAL APPOINTMENTS OR FROM GETTING MEDICATIONS?: NO

## 2025-02-04 NOTE — PROGRESS NOTES
Grace Madden, was evaluated through a synchronous (real-time) audio-video encounter. The patient (or guardian if applicable) is aware that this is a billable service, which includes applicable co-pays. This Virtual Visit was conducted with patient's (and/or legal guardian's) consent. Patient identification was verified, and a caregiver was present when appropriate.   The patient was located at Home: [id number] [place] [address] Dr Josué [place] [address]  Provider was located at Home (Appt Dept State[place]  Confirm you are appropriately licensed, registered, or certified to deliver care in the state where the patient is located as indicated above. If you are not or unsure, please re-schedule the visit: Yes, I confirm.     Grace Madden (:  1970) is a Established patient, presenting virtually for evaluation of the following:      Below is the assessment and plan developed based on review of pertinent history, physical exam, labs, studies, and medications.     Assessment & Plan  Anxiety and depression   Try increased dose prozac w room to increase if needed    Orders:    FLUoxetine (PROZAC) 20 MG capsule; Take 1 capsule by mouth daily    hydrOXYzine HCl (ATARAX) 50 MG tablet; Take 1 tablet by mouth nightly as needed for Anxiety (sleep)    Primary insomnia       Orders:    FLUoxetine (PROZAC) 20 MG capsule; Take 1 capsule by mouth daily    hydrOXYzine HCl (ATARAX) 50 MG tablet; Take 1 tablet by mouth nightly as needed for Anxiety (sleep)      No follow-ups on file.       Subjective   HPI  Review of Systems    Depression and anxiety: Patient continues to have crying spells, decreased appetite, difficulty staying asleep.  Wellbutrin is not helping with the symptoms.  She tried Zoloft in the past which she recalls giving her a dry mouth and headache.  She thinks she tried for about 2 weeks.  In the past she was on fluoxetine at 10 mg which did not help with mood symptoms but did not cause any

## 2025-04-01 DIAGNOSIS — F32.A ANXIETY AND DEPRESSION: ICD-10-CM

## 2025-04-01 DIAGNOSIS — F51.01 PRIMARY INSOMNIA: ICD-10-CM

## 2025-04-01 DIAGNOSIS — F41.9 ANXIETY AND DEPRESSION: ICD-10-CM

## 2025-05-11 DIAGNOSIS — F41.9 ANXIETY AND DEPRESSION: ICD-10-CM

## 2025-05-11 DIAGNOSIS — F32.A ANXIETY AND DEPRESSION: ICD-10-CM

## 2025-05-11 DIAGNOSIS — F51.01 PRIMARY INSOMNIA: ICD-10-CM

## 2025-05-27 ENCOUNTER — TRANSCRIBE ORDERS (OUTPATIENT)
Facility: HOSPITAL | Age: 55
End: 2025-05-27

## 2025-05-27 DIAGNOSIS — Z91.89 OTHER SPECIFIED PERSONAL RISK FACTORS, NOT ELSEWHERE CLASSIFIED: Primary | ICD-10-CM
